# Patient Record
Sex: FEMALE | Race: WHITE | NOT HISPANIC OR LATINO | Employment: FULL TIME | URBAN - METROPOLITAN AREA
[De-identification: names, ages, dates, MRNs, and addresses within clinical notes are randomized per-mention and may not be internally consistent; named-entity substitution may affect disease eponyms.]

---

## 2018-08-23 ENCOUNTER — TELEPHONE (OUTPATIENT)
Dept: PODIATRY | Facility: CLINIC | Age: 33
End: 2018-08-23

## 2018-08-23 NOTE — TELEPHONE ENCOUNTER
----- Message from Glendy Fernandez sent at 8/23/2018 10:12 AM CDT -----  Contact: patient   Patient calling to schedule a same day appointment today for Plantar fasciitis. No available appointments. Please advise.   Call back    Thanks!

## 2018-08-29 ENCOUNTER — OFFICE VISIT (OUTPATIENT)
Dept: PODIATRY | Facility: CLINIC | Age: 33
End: 2018-08-29
Payer: OTHER GOVERNMENT

## 2018-08-29 VITALS
BODY MASS INDEX: 35 KG/M2 | WEIGHT: 205 LBS | TEMPERATURE: 98 F | DIASTOLIC BLOOD PRESSURE: 76 MMHG | OXYGEN SATURATION: 100 % | RESPIRATION RATE: 18 BRPM | HEART RATE: 72 BPM | HEIGHT: 64 IN | SYSTOLIC BLOOD PRESSURE: 113 MMHG

## 2018-08-29 DIAGNOSIS — M72.2 PLANTAR FASCIITIS: Primary | ICD-10-CM

## 2018-08-29 DIAGNOSIS — M79.672 FOOT PAIN, LEFT: ICD-10-CM

## 2018-08-29 PROCEDURE — 99213 OFFICE O/P EST LOW 20 MIN: CPT | Mod: PBBFAC | Performed by: PODIATRIST

## 2018-08-29 PROCEDURE — 99999 PR PBB SHADOW E&M-EST. PATIENT-LVL III: CPT | Mod: PBBFAC,,, | Performed by: PODIATRIST

## 2018-08-29 PROCEDURE — 99203 OFFICE O/P NEW LOW 30 MIN: CPT | Mod: S$PBB,,, | Performed by: PODIATRIST

## 2018-08-29 RX ORDER — DICLOFENAC SODIUM 75 MG/1
75 TABLET, DELAYED RELEASE ORAL 2 TIMES DAILY
Qty: 60 TABLET | Refills: 1 | Status: SHIPPED | OUTPATIENT
Start: 2018-08-29 | End: 2018-09-28

## 2018-08-29 RX ORDER — CYCLOBENZAPRINE HCL 5 MG
TABLET ORAL
COMMUNITY
Start: 2018-05-31 | End: 2019-01-28

## 2018-08-29 RX ORDER — DICLOFENAC POTASSIUM 50 MG/1
TABLET, FILM COATED ORAL
COMMUNITY
Start: 2018-07-26 | End: 2019-01-28

## 2018-08-29 RX ORDER — MELOXICAM 15 MG/1
TABLET ORAL
COMMUNITY
Start: 2018-05-31 | End: 2019-01-28

## 2018-08-29 RX ORDER — BUPROPION HYDROCHLORIDE 150 MG/1
TABLET ORAL
COMMUNITY
Start: 2018-06-08 | End: 2024-02-26

## 2018-08-29 NOTE — LETTER
September 3, 2018      [facility]  [address] [place] [address]           [facility] - Podiatry/Wound Care  [address] [place] [address]  Phone: [phone]  Fax: [phone]          Patient: Jelly Mcclellan   MR Number: [id number]   YOB: 1985   Date of Visit: 8/29/2018       Dear [facility]:    Thank you for referring Jelly Mcclellan to me for evaluation. Attached you will find relevant portions of my assessment and plan of care.    If you have questions, please do not hesitate to call me. I look forward to following Jelly Mcclellan along with you.    Sincerely,    James Mcclain, DPM    Enclosure  CC:  No Recipients    If you would like to receive this communication electronically, please contact [email or web address] or [phone] to request more information on [employer] Link access.    For providers and/or their staff who would like to refer a patient to [facility], please contact us through our one-stop-shop provider referral line, [facility], at [phone].    If you feel you have received this communication in error or would no longer like to receive these types of communications, please e-mail [email or web address]

## 2018-09-03 NOTE — PROGRESS NOTES
Subjective:       Patient ID: Jelly Mcclellan is a 33 y.o. female.    Chief Complaint: Foot Pain (Lt foot pain)    HPI  Patient presents today with complaint of left foot pain she states this has been going on for an extended period of time she states it is not really been treated and has gotten progressively worse she has significant pain in her left foot on 1st steps out of bed in the morning.  Review of Systems   Musculoskeletal: Positive for gait problem.   All other systems reviewed and are negative.      Objective:      Physical Exam   Constitutional: She appears well-developed and well-nourished.   Cardiovascular:   Pulses:       Dorsalis pedis pulses are 2+ on the right side, and 2+ on the left side.        Posterior tibial pulses are 2+ on the right side, and 2+ on the left side.   Musculoskeletal: Normal range of motion.   Feet:   Right Foot:   Protective Sensation: 4 sites tested. 4 sites sensed.   Left Foot:   Protective Sensation: 4 sites tested. 4 sites sensed.   Skin Integrity: Positive for erythema and warmth.   Neurological: She is alert.   Skin: Skin is warm. Capillary refill takes less than 2 seconds.   Psychiatric: She has a normal mood and affect. Her behavior is normal. Judgment and thought content normal.   Nursing note and vitals reviewed.      Patient has findings consistent with plantar fasciitis left patient has significantly elevated arches both weight-bearing and nonweightbearing bilateral.  Assessment:       1. Plantar fasciitis    2. Foot pain, left        Plan:       The etiology and pathology were discussed in detail with the patient in regards to plantar fasciitis. Educational material regarding plantar fasciitis was discussed. Patient was advised to discontinue all barefoot walking and start wearing power step arch supports which the patient was recommended to purchase at all times of weightbearing. Patient is to begin wearing her shoes upon first gets out of bed in the  morning.patient was advised that the reason that she has plantar fasciitis is her significantly elevated arch is obviously she's not getting enough arch support I did recommend power step arch supports for the patient these are the one at all times weight-bearing.  Patient was dispensed power step arch support she is to wear these at all times of weight-bearing I have also started the patient on diclofenac to take as directed she should notice significant improvement over the next several weeks plan follow-up is 2-3 weeks I have advised her I may need to add and build up her power steps as tolerated giving her additional support we can also consider custom molded orthotics as needed.  Total face-to-face time including discussion evaluation treatment equaled 35 min.

## 2018-09-12 ENCOUNTER — OFFICE VISIT (OUTPATIENT)
Dept: PODIATRY | Facility: CLINIC | Age: 33
End: 2018-09-12
Payer: OTHER GOVERNMENT

## 2018-09-12 VITALS
HEART RATE: 70 BPM | WEIGHT: 205 LBS | DIASTOLIC BLOOD PRESSURE: 71 MMHG | SYSTOLIC BLOOD PRESSURE: 115 MMHG | HEIGHT: 64 IN | BODY MASS INDEX: 35 KG/M2

## 2018-09-12 DIAGNOSIS — M72.2 PLANTAR FASCIITIS: Primary | ICD-10-CM

## 2018-09-12 PROCEDURE — 99999 PR PBB SHADOW E&M-EST. PATIENT-LVL III: CPT | Mod: PBBFAC,,, | Performed by: PODIATRIST

## 2018-09-12 PROCEDURE — 99213 OFFICE O/P EST LOW 20 MIN: CPT | Mod: PBBFAC | Performed by: PODIATRIST

## 2018-09-12 PROCEDURE — 99214 OFFICE O/P EST MOD 30 MIN: CPT | Mod: S$PBB,,, | Performed by: PODIATRIST

## 2018-09-16 NOTE — PROGRESS NOTES
Subjective:       Patient ID: Jelly Mcclellan is a 33 y.o. female.    Chief Complaint: Follow-up (plantar fasciitis)    HPI  Patient presents today with complaint of left foot pain she states this has been going on for an extended period of time she states it is not really been treated and has gotten progressively worse she has significant pain in her left foot on 1st steps out of bed in the morning.  Review of Systems   Musculoskeletal: Positive for gait problem.   All other systems reviewed and are negative.      Objective:      Physical Exam   Constitutional: She appears well-developed and well-nourished.   Cardiovascular:   Pulses:       Dorsalis pedis pulses are 2+ on the right side, and 2+ on the left side.        Posterior tibial pulses are 2+ on the right side, and 2+ on the left side.   Musculoskeletal: Normal range of motion.   Feet:   Right Foot:   Protective Sensation: 4 sites tested. 4 sites sensed.   Left Foot:   Protective Sensation: 4 sites tested. 4 sites sensed.   Skin Integrity: Positive for erythema and warmth.   Neurological: She is alert.   Skin: Skin is warm. Capillary refill takes less than 2 seconds.   Psychiatric: She has a normal mood and affect. Her behavior is normal. Judgment and thought content normal.   Nursing note and vitals reviewed.      Patient has findings consistent with plantar fasciitis left patient has significantly elevated arches both weight-bearing and nonweightbearing bilateral.  Assessment:       1. Plantar fasciitis        Plan:         On evaluation patient states that she is doing significantly better she relates that she tolerated the diclofenac very well and had no problems taking it she relates that overall she is about 90% better she only has some mild pain after she has gone running she states that she got use of the arch supports almost immediately and they are not causing her any discomfort.  Patient was advised it is obvious that she needs some additional arch  support to get her to 100% pain relief she is 90% right now I added a blue arch pad to the plantar arch of the patient's left power step I do want her to continue taking the diclofenac I have advised her that over the next several weeks her pain should be completely resolved I do however want to see her for follow-up in 3 weeks to ensure that she is 100% better I have advised the patient we need to consider possibly custom molded orthotics for her versus continuing with the power steps however I did advise her that if the power steps her working really well for her she may want to consider just continuing with the power steps and not do the custom-made arch supports and LEs were not able to get her 100% relief with the over-the-counter support follow-up 3 weeks.  Total face-to-face time including discussion evaluation treatment equaled 25 min.

## 2018-10-03 ENCOUNTER — OFFICE VISIT (OUTPATIENT)
Dept: PODIATRY | Facility: CLINIC | Age: 33
End: 2018-10-03
Payer: OTHER GOVERNMENT

## 2018-10-03 VITALS
DIASTOLIC BLOOD PRESSURE: 72 MMHG | HEIGHT: 64 IN | TEMPERATURE: 98 F | BODY MASS INDEX: 35 KG/M2 | WEIGHT: 205 LBS | HEART RATE: 71 BPM | SYSTOLIC BLOOD PRESSURE: 112 MMHG

## 2018-10-03 DIAGNOSIS — M72.2 PLANTAR FASCIITIS: Primary | ICD-10-CM

## 2018-10-03 PROCEDURE — 99214 OFFICE O/P EST MOD 30 MIN: CPT | Mod: S$PBB,,, | Performed by: PODIATRIST

## 2018-10-03 PROCEDURE — 99213 OFFICE O/P EST LOW 20 MIN: CPT | Mod: PBBFAC | Performed by: PODIATRIST

## 2018-10-03 PROCEDURE — 99999 PR PBB SHADOW E&M-EST. PATIENT-LVL III: CPT | Mod: PBBFAC,,, | Performed by: PODIATRIST

## 2018-10-07 PROBLEM — M72.2 PLANTAR FASCIITIS: Status: ACTIVE | Noted: 2018-10-07

## 2018-10-07 NOTE — PROGRESS NOTES
Subjective:       Patient ID: Jelly Mcclellan is a 33 y.o. female.    Chief Complaint: Follow-up; Foot Pain; and Foot Problem  Patient presents for follow-up plantar fasciitis left.  Patient states she is doing better and states the extra support that I applied to her power step sepsis helped quite a bit she states that the majority of her pain is being controlled she still having some discomfort at the end of the day.    HPI  Review of Systems   Musculoskeletal: Positive for gait problem.   All other systems reviewed and are negative.      Objective:      Physical Exam   Constitutional: She appears well-developed and well-nourished.   Cardiovascular:   Pulses:       Dorsalis pedis pulses are 2+ on the right side, and 2+ on the left side.        Posterior tibial pulses are 2+ on the right side, and 2+ on the left side.   Musculoskeletal: Normal range of motion.   Feet:   Right Foot:   Protective Sensation: 4 sites tested. 4 sites sensed.   Left Foot:   Protective Sensation: 4 sites tested. 4 sites sensed.   Skin Integrity: Positive for erythema and warmth.   Neurological: She is alert.   Skin: Skin is warm. Capillary refill takes less than 2 seconds.   Psychiatric: She has a normal mood and affect. Her behavior is normal. Judgment and thought content normal.   Nursing note and vitals reviewed.      Patient has findings consistent with plantar fasciitis left patient has significantly elevated arches both weight-bearing and nonweightbearing bilateral.  Assessment:       1. Plantar fasciitis        Plan:          Following evaluation I have recommended custom-molded orthotics for the patient she has done really well with the over-the-counter arch supports however I feel we best long-term that she be wearing custom-molded orthotics that her fitted specifically to her feet.  Patient advised the over-the-counter support has helped her dramatically however I am concerned that because she is not getting firm rigid support  she may start to have some of her pain come back in the future patient was in understanding of this patient was casted for custom-molded orthotics today she will continue wearing her power steps with the additional support as directed and continue to take her anti inflammatory.  I will plan to follow up with the patient once orthotics are ready to be dispensed to her.  Total face-to-face time including discussion evaluation and casting the patient for custom-molded orthotics equaled 25 min.

## 2018-10-10 ENCOUNTER — TELEPHONE (OUTPATIENT)
Dept: PODIATRY | Facility: CLINIC | Age: 33
End: 2018-10-10

## 2018-10-10 NOTE — TELEPHONE ENCOUNTER
----- Message from Mary Gordon sent at 10/10/2018 12:44 PM CDT -----  Contact: self [phone]  States that she would like to speak to nurse regarding pain to the top of her foot. Please call back at [phone]//thank you acc

## 2018-10-15 ENCOUNTER — OFFICE VISIT (OUTPATIENT)
Dept: PODIATRY | Facility: CLINIC | Age: 33
End: 2018-10-15
Payer: OTHER GOVERNMENT

## 2018-10-15 ENCOUNTER — HOSPITAL ENCOUNTER (OUTPATIENT)
Dept: RADIOLOGY | Facility: HOSPITAL | Age: 33
Discharge: HOME | End: 2018-10-15
Attending: PODIATRIST
Payer: OTHER GOVERNMENT

## 2018-10-15 VITALS
HEART RATE: 78 BPM | WEIGHT: 200 LBS | BODY MASS INDEX: 34.15 KG/M2 | DIASTOLIC BLOOD PRESSURE: 73 MMHG | HEIGHT: 64 IN | SYSTOLIC BLOOD PRESSURE: 115 MMHG

## 2018-10-15 DIAGNOSIS — M72.2 PLANTAR FASCIITIS: ICD-10-CM

## 2018-10-15 DIAGNOSIS — M79.673 PAIN OF FOOT, UNSPECIFIED LATERALITY: Primary | ICD-10-CM

## 2018-10-15 DIAGNOSIS — M79.673 PAIN OF FOOT, UNSPECIFIED LATERALITY: ICD-10-CM

## 2018-10-15 PROCEDURE — 99213 OFFICE O/P EST LOW 20 MIN: CPT | Mod: S$PBB,,, | Performed by: PODIATRIST

## 2018-10-15 PROCEDURE — 99213 OFFICE O/P EST LOW 20 MIN: CPT | Mod: PBBFAC,25 | Performed by: PODIATRIST

## 2018-10-15 PROCEDURE — 99999 PR PBB SHADOW E&M-EST. PATIENT-LVL III: CPT | Mod: PBBFAC,,, | Performed by: PODIATRIST

## 2018-10-15 PROCEDURE — 73630 X-RAY EXAM OF FOOT: CPT | Mod: TC,FY,LT

## 2018-10-15 PROCEDURE — 73630 X-RAY EXAM OF FOOT: CPT | Mod: 26,LT,, | Performed by: RADIOLOGY

## 2018-10-17 ENCOUNTER — TELEPHONE (OUTPATIENT)
Dept: PODIATRY | Facility: CLINIC | Age: 33
End: 2018-10-17

## 2018-10-17 NOTE — TELEPHONE ENCOUNTER
----- Message from Devan Hernandez sent at 10/17/2018  3:10 PM CDT -----  Type: Needs Medical Advice    Who Called:  Patient  Best Call Back Number: [phone]  Additional Information: Need doctors note for no running and walking at own pace - please call to advise.

## 2018-10-19 PROBLEM — M79.673 PAIN OF FOOT: Status: ACTIVE | Noted: 2018-10-19

## 2018-10-20 NOTE — PROGRESS NOTES
Subjective:       Patient ID: Jelly Mcclellan is a 33 y.o. female.   patient presents today for an unscheduled appointment she has been having swelling and pain in the top of her left foot she has previously been treated for plantar fasciitis but which is being well controlled she relates no trauma injury or increased activity in this area.  Chief Complaint: Follow-up; Foot Pain; and Foot Problem  Foot Pain       Review of Systems   Musculoskeletal: Positive for gait problem.   All other systems reviewed and are negative.      Objective:      Physical Exam   Constitutional: She appears well-developed and well-nourished.   Cardiovascular:   Pulses:       Dorsalis pedis pulses are 2+ on the right side, and 2+ on the left side.        Posterior tibial pulses are 2+ on the right side, and 2+ on the left side.   Musculoskeletal: Normal range of motion.   Feet:   Right Foot:   Protective Sensation: 4 sites tested. 4 sites sensed.   Left Foot:   Protective Sensation: 4 sites tested. 4 sites sensed.   Skin Integrity: Positive for erythema and warmth.   Neurological: She is alert.   Skin: Skin is warm. Capillary refill takes less than 2 seconds.   Psychiatric: She has a normal mood and affect. Her behavior is normal. Judgment and thought content normal.   Nursing note and vitals reviewed.      Patient has findings consistent with plantar fasciitis left patient has significantly elevated arches both weight-bearing and nonweightbearing bilateral.  Patient displays inflammation on the dorsal aspect of the patient's left foot in the region of the 4th and 5th metatarsal head discomfort is limited upon palpation and range of motion.  Assessment:       1. Pain of foot, unspecified laterality - Left Foot    2. Plantar fasciitis        Plan:            Upon evaluation patient has previously been treated for plantar fasciitis left she states this condition is stable she is wearing her over-the-counter arch supports we are currently  awaiting approval for custom molded orthotics for the patient she has already been cast for these in the meantime patient has developed pain and swelling on the top of her left foot this raises concern for stress fracture x-rays were evaluated of the left foot there are no current signs of stress fracture noted patient does have a deformity with lateral deviation of the 4th met head however the spacing intermetatarsal area is normal and the patient does not appear to have findings consistent with neuroma this is likely metatarsalgia pain related to the patient's need for better support and continued support.  A small adhesive felt metatarsal pad was applied to the patient's left insole to offload pressure from the forefoot I have advised the patient it may take several days to use this but this should relieve the pain and discomfort that she is having she is currently taking diclofenac and will continue taking this I will follow up with the patient several weeks obviously if this becomes worse more painful more swollen she is to contact me immediately.

## 2018-10-29 ENCOUNTER — OFFICE VISIT (OUTPATIENT)
Dept: PODIATRY | Facility: CLINIC | Age: 33
End: 2018-10-29
Payer: OTHER GOVERNMENT

## 2018-10-29 VITALS
HEIGHT: 64 IN | HEART RATE: 85 BPM | SYSTOLIC BLOOD PRESSURE: 122 MMHG | TEMPERATURE: 98 F | DIASTOLIC BLOOD PRESSURE: 86 MMHG | WEIGHT: 200 LBS | BODY MASS INDEX: 34.15 KG/M2

## 2018-10-29 DIAGNOSIS — M72.2 PLANTAR FASCIITIS: Primary | ICD-10-CM

## 2018-10-29 DIAGNOSIS — M79.672 LEFT FOOT PAIN: ICD-10-CM

## 2018-10-29 PROCEDURE — 99213 OFFICE O/P EST LOW 20 MIN: CPT | Mod: PBBFAC | Performed by: PODIATRIST

## 2018-10-29 PROCEDURE — 99999 PR PBB SHADOW E&M-EST. PATIENT-LVL III: CPT | Mod: PBBFAC,,, | Performed by: PODIATRIST

## 2018-10-29 PROCEDURE — 99213 OFFICE O/P EST LOW 20 MIN: CPT | Mod: S$PBB,,, | Performed by: PODIATRIST

## 2018-10-29 NOTE — LETTER
October 29, 2018      [facility] - [facility]/Wound Care  [address] [place] [address]  Phone: [phone]  Fax: [phone]       Patient: Jelly Mcclellan   YOB: 1985  Date of Visit: 10/29/2018    To Whom It May Concern:    Dionna Mcclellan  was at [facility] on 10/29/2018. Please allow patient to walk at own pace instead of running during PT until 12/1/2018. If you have any questions or concerns, or if I can be of further assistance, please do not hesitate to contact me.    Sincerely,        Barb Gr RN

## 2018-11-04 NOTE — PROGRESS NOTES
Subjective:       Patient ID: Jelly Mcclellan is a 33 y.o. female.  Patient presents today for followup she continues to have pain in the top of her left foot she states her decreased activity at work has definitely helped she staying physically active she is just not running or jogging she relates the metatarsal pad that I put on her insole moved approximately 48 hr after it was placed because of the type of boots that she has to wear work.  Chief Complaint: Follow-up; Foot Pain; and Foot Problem  Foot Pain       Review of Systems   Musculoskeletal: Positive for gait problem.   All other systems reviewed and are negative.      Objective:      Physical Exam   Constitutional: She appears well-developed and well-nourished.   Cardiovascular:   Pulses:       Dorsalis pedis pulses are 2+ on the right side, and 2+ on the left side.        Posterior tibial pulses are 2+ on the right side, and 2+ on the left side.   Musculoskeletal: Normal range of motion.   Feet:   Right Foot:   Protective Sensation: 4 sites tested. 4 sites sensed.   Left Foot:   Protective Sensation: 4 sites tested. 4 sites sensed.   Skin Integrity: Positive for erythema and warmth.   Neurological: She is alert.   Skin: Skin is warm. Capillary refill takes less than 2 seconds.   Psychiatric: She has a normal mood and affect. Her behavior is normal. Judgment and thought content normal.   Nursing note and vitals reviewed.      Patient has findings consistent with plantar fasciitis left patient has significantly elevated arches both weight-bearing and nonweightbearing bilateral.  Patient displays inflammation on the dorsal aspect of the patient's left foot in the region of the 4th and 5th metatarsal head discomfort is limited upon palpation and range of motion.  Assessment:       1. Plantar fasciitis    2. Left foot pain        Plan:             Following evaluation patient continues to have left foot pain in the metatarsal head region this in addition to the  patient's plantar fascial pain which is much better controlled at this time I am recommending that we extended the patient's reduced activity at work extension the patient has gotten better and had less symptoms with less activity and were still try currently treating an active problem I feel it is in the patient's best interest to have a reduced activity load.  Patient indicated the temporary metatarsal pad that I placed on her power steps moved within the 1st 48 hr I have advised her obviously we can't tell whether not that was helping her I did apply a much more aggressive blue soft metatarsal pad to offload pressure from the left forefoot I want see how the patient responds to this I have advised the patient we have also got approval for her custom-molded orthotics these are in the process of being made we may have to make some adjustments to these with the metatarsal pad to the left depending upon how the patient responds to the pad that I have applied today.  Patient will continue diclofenac as directed wear her arch supports at all times of weight-bearing with a new metatarsal pad I will see her for follow up when her orthotics are ready to be dispensed to her and make a determination whether not we need to make any aggressive adjustments in relation to the metatarsal pad.  Follow up when her orthotics are ready to be dispensed.  Total face-to-face time equaled 20 min.

## 2019-01-28 ENCOUNTER — OFFICE VISIT (OUTPATIENT)
Dept: PODIATRY | Facility: CLINIC | Age: 34
End: 2019-01-28
Payer: OTHER GOVERNMENT

## 2019-01-28 VITALS
BODY MASS INDEX: 34.15 KG/M2 | HEART RATE: 77 BPM | SYSTOLIC BLOOD PRESSURE: 112 MMHG | TEMPERATURE: 98 F | DIASTOLIC BLOOD PRESSURE: 74 MMHG | HEIGHT: 64 IN | WEIGHT: 200 LBS

## 2019-01-28 DIAGNOSIS — M79.672 LEFT FOOT PAIN: ICD-10-CM

## 2019-01-28 DIAGNOSIS — M72.2 PLANTAR FASCIITIS: Primary | ICD-10-CM

## 2019-01-28 PROCEDURE — 99213 OFFICE O/P EST LOW 20 MIN: CPT | Mod: PBBFAC | Performed by: PODIATRIST

## 2019-01-28 PROCEDURE — 99213 PR OFFICE/OUTPT VISIT, EST, LEVL III, 20-29 MIN: ICD-10-PCS | Mod: S$PBB,,, | Performed by: PODIATRIST

## 2019-01-28 PROCEDURE — 99213 OFFICE O/P EST LOW 20 MIN: CPT | Mod: S$PBB,,, | Performed by: PODIATRIST

## 2019-01-28 PROCEDURE — 99999 PR PBB SHADOW E&M-EST. PATIENT-LVL III: CPT | Mod: PBBFAC,,, | Performed by: PODIATRIST

## 2019-01-28 PROCEDURE — 99999 PR PBB SHADOW E&M-EST. PATIENT-LVL III: ICD-10-PCS | Mod: PBBFAC,,, | Performed by: PODIATRIST

## 2019-01-28 RX ORDER — TRAMADOL HYDROCHLORIDE 50 MG/1
TABLET ORAL
Refills: 0 | COMMUNITY
Start: 2019-01-09 | End: 2024-02-26

## 2019-01-28 RX ORDER — LATANOPROST 50 UG/ML
SOLUTION/ DROPS OPHTHALMIC
Refills: 3 | COMMUNITY
Start: 2019-01-08 | End: 2019-01-28

## 2019-01-28 NOTE — LETTER
January 31, 2019      [facility]  [address] [place] [address]           [facility] - Podiatry/Wound Care  [address] [place] [address]  Phone: [phone]  Fax: [phone]          Patient: Jelly Mcclellan   MR Number: [id number]   YOB: 1985   Date of Visit: 1/28/2019       Dear [facility]:    Thank you for referring Jelly Mcclellan to me for evaluation. Attached you will find relevant portions of my assessment and plan of care.    If you have questions, please do not hesitate to call me. I look forward to following Jelly Mcclellan along with you.    Sincerely,    Pamela Mondragon  CC:  No Recipients    If you would like to receive this communication electronically, please contact [email or web address] or [phone] to request more information on [employer] Link access.    For providers and/or their staff who would like to refer a patient to [facility], please contact us through our one-stop-shop provider referral line, [facility], at [phone].    If you feel you have received this communication in error or would no longer like to receive these types of communications, please e-mail [email or web address]

## 2019-02-02 NOTE — PROGRESS NOTES
Subjective:       Patient ID: Jelly Mcclellan is a 33 y.o. female.  Patient presents today for followup she continues to have pain in the top of her left foot she states her decreased activity at work has definitely helped she staying physically active she is just not running or jogging she relates the metatarsal pad that I put on her insole moved approximately 48 hr after it was placed because of the type of boots that she has to wear work.  Chief Complaint: Follow-up and Foot Problem  Foot Pain       Review of Systems   Musculoskeletal: Positive for gait problem.   All other systems reviewed and are negative.      Objective:      Physical Exam   Constitutional: She appears well-developed and well-nourished.   Cardiovascular:   Pulses:       Dorsalis pedis pulses are 2+ on the right side, and 2+ on the left side.        Posterior tibial pulses are 2+ on the right side, and 2+ on the left side.   Musculoskeletal: Normal range of motion.   Feet:   Right Foot:   Protective Sensation: 4 sites tested. 4 sites sensed.   Left Foot:   Protective Sensation: 4 sites tested. 4 sites sensed.   Skin Integrity: Positive for erythema and warmth.   Neurological: She is alert.   Skin: Skin is warm. Capillary refill takes less than 2 seconds.   Psychiatric: She has a normal mood and affect. Her behavior is normal. Judgment and thought content normal.   Nursing note and vitals reviewed.      Patient has findings consistent with plantar fasciitis left patient has significantly elevated arches both weight-bearing and nonweightbearing bilateral.  Patient displays inflammation on the dorsal aspect of the patient's left foot in the region of the 4th and 5th metatarsal head discomfort is limited upon palpation and range of motion.  Assessment:       1. Plantar fasciitis    2. Left foot pain        Plan:            Patient presents for follow-up plantar fasciitis left she states she has been doing great she has had very little if any  discomfort at all in her left heel the over-the-counter arch supports have done very well patient was previously cast for custom-molded orthotics there has been a delay in getting these approved and fabricated and dispensed to the patient but she is here today to have them dispensed to her they were noted to be a very good fit fitting the patient very well I did advise her we can make adjustments to these as necessary if we need to add a metatarsal pad to the custom-made orthotics this can easily be done she was in understanding and agreement with this I plan to follow up with her as needed.  Total face-to-face time equaled 15 min.

## 2024-02-29 ENCOUNTER — HOSPITAL ENCOUNTER (OUTPATIENT)
Dept: RADIOLOGY | Facility: HOSPITAL | Age: 39
Discharge: HOME | End: 2024-02-29
Attending: NURSE PRACTITIONER
Payer: OTHER GOVERNMENT

## 2024-02-29 DIAGNOSIS — Z01.419 WOMEN'S ANNUAL ROUTINE GYNECOLOGICAL EXAMINATION: ICD-10-CM

## 2024-02-29 DIAGNOSIS — Z80.3 FAMILY HISTORY OF BREAST CANCER: ICD-10-CM

## 2024-02-29 PROCEDURE — 77063 BREAST TOMOSYNTHESIS BI: CPT | Mod: 26,,, | Performed by: RADIOLOGY

## 2024-02-29 PROCEDURE — 77067 SCR MAMMO BI INCL CAD: CPT | Mod: 26,,, | Performed by: RADIOLOGY

## 2024-02-29 PROCEDURE — 77067 SCR MAMMO BI INCL CAD: CPT | Mod: TC

## 2024-03-13 ENCOUNTER — HOSPITAL ENCOUNTER (OUTPATIENT)
Dept: RADIOLOGY | Facility: HOSPITAL | Age: 39
Discharge: HOME | End: 2024-03-13
Attending: NURSE PRACTITIONER
Payer: OTHER GOVERNMENT

## 2024-03-13 DIAGNOSIS — R92.8 ABNORMAL MAMMOGRAM: ICD-10-CM

## 2024-03-13 PROCEDURE — 77065 DX MAMMO INCL CAD UNI: CPT | Mod: TC,LT

## 2024-03-13 PROCEDURE — 76642 ULTRASOUND BREAST LIMITED: CPT | Mod: 26,LT,, | Performed by: RADIOLOGY

## 2024-03-13 PROCEDURE — 77061 BREAST TOMOSYNTHESIS UNI: CPT | Mod: 26,LT,, | Performed by: RADIOLOGY

## 2024-03-13 PROCEDURE — 77061 BREAST TOMOSYNTHESIS UNI: CPT | Mod: TC,LT

## 2024-03-13 PROCEDURE — 76642 ULTRASOUND BREAST LIMITED: CPT | Mod: TC,LT

## 2024-03-13 PROCEDURE — 77065 DX MAMMO INCL CAD UNI: CPT | Mod: 26,LT,, | Performed by: RADIOLOGY
